# Patient Record
Sex: FEMALE | Race: WHITE | Employment: UNEMPLOYED | URBAN - NONMETROPOLITAN AREA
[De-identification: names, ages, dates, MRNs, and addresses within clinical notes are randomized per-mention and may not be internally consistent; named-entity substitution may affect disease eponyms.]

---

## 2022-05-26 ENCOUNTER — HOSPITAL ENCOUNTER (EMERGENCY)
Age: 47
Discharge: HOME | End: 2022-05-26
Attending: EMERGENCY MEDICINE
Payer: COMMERCIAL

## 2022-05-26 VITALS
TEMPERATURE: 98.2 F | SYSTOLIC BLOOD PRESSURE: 176 MMHG | OXYGEN SATURATION: 98 % | DIASTOLIC BLOOD PRESSURE: 89 MMHG | HEART RATE: 86 BPM | RESPIRATION RATE: 16 BRPM

## 2022-05-26 DIAGNOSIS — M79.671 RIGHT FOOT PAIN: Primary | ICD-10-CM

## 2022-05-26 PROCEDURE — 99281 EMR DPT VST MAYX REQ PHY/QHP: CPT

## 2022-05-26 ASSESSMENT — PAIN DESCRIPTION - DESCRIPTORS: DESCRIPTORS: ACHING

## 2022-05-26 ASSESSMENT — ENCOUNTER SYMPTOMS
ABDOMINAL PAIN: 0
TROUBLE SWALLOWING: 0
NAUSEA: 0
SHORTNESS OF BREATH: 0
COUGH: 0
VOMITING: 0
EYE REDNESS: 0
COLOR CHANGE: 0
BACK PAIN: 0

## 2022-05-26 ASSESSMENT — PAIN - FUNCTIONAL ASSESSMENT: PAIN_FUNCTIONAL_ASSESSMENT: 0-10

## 2022-05-26 ASSESSMENT — PAIN DESCRIPTION - LOCATION: LOCATION: FOOT

## 2022-05-26 ASSESSMENT — PAIN DESCRIPTION - ORIENTATION: ORIENTATION: RIGHT

## 2022-05-26 ASSESSMENT — PAIN SCALES - GENERAL: PAINLEVEL_OUTOF10: 8

## 2022-05-26 NOTE — ED PROVIDER NOTES
[facility] [address] [address] EMERGENCY DEPT    EMERGENCY MEDICINE     Pt Name: Kofi Mariee  MRN: [id number]  [place] 1975  Date of evaluation: 5/26/2022  Provider: Lyric Irizarry MD,     [address] [place] [address]       Chief Complaint   Patient presents with    Foot Pain     right       HISTORY OF PRESENT ILLNESS    Kofi Mariee is a pleasant 42 y.o. female who presents to the emergency department from home who presents for right foot pain. Patient states that she has noticed pain since May 19. She denies any known injury. She does not recall dropping any objects on it, twisting it, or tripping. Patient states that she has known plantar fasciitis however this pain is on the top of her foot. It starts near her fifth toe and radiates across the foot to her large toe. She states it does not hurt to touch but is painful to put pressure on. She does admit that she has been on her feet a lot for work. She has been taking Motrin with minimal relief for pain. Triage notes and Nursing notes were reviewed by myself. Any discrepancies are addressed above. PAST MEDICAL HISTORY   No past medical history on file. SURGICAL HISTORY     No past surgical history on file. CURRENT MEDICATIONS       There are no discharge medications for this patient. ALLERGIES     Patient has no known allergies. FAMILY HISTORY     No family history on file.      SOCIAL HISTORY       Social History     Socioeconomic History    Marital status:      Spouse name: Not on file    Number of children: Not on file    Years of education: Not on file    Highest education level: Not on file   Occupational History    Not on file   Tobacco Use    Smoking status: Never Smoker    Smokeless tobacco: Never Used   Substance and Sexual Activity    Alcohol use: Never    Drug use: Never    Sexual activity: Not on file   Other Topics Concern    Not on file   Social History Narrative    Not on file     Social Determinants of Health Financial Resource Strain:     Difficulty of Paying Living Expenses: Not on file   Food Insecurity:     Worried About Running Out of Food in the Last Year: Not on file    Maida of Food in the Last Year: Not on file   Transportation Needs:     Lack of Transportation (Medical): Not on file    Lack of Transportation (Non-Medical): Not on file   Physical Activity:     Days of Exercise per Week: Not on file    Minutes of Exercise per Session: Not on file   Stress:     Feeling of Stress : Not on file   Social Connections:     Frequency of Communication with Friends and Family: Not on file    Frequency of Social Gatherings with Friends and Family: Not on file    Attends Latter day Services: Not on file    Active Member of [address] [place] [address] or Organizations: Not on file    Attends Club or Organization Meetings: Not on file    Marital Status: Not on file   Intimate Partner Violence:     Fear of Current or Ex-Partner: Not on file    Emotionally Abused: Not on file    Physically Abused: Not on file    Sexually Abused: Not on file   Housing Stability:     Unable to Pay for Housing in the Last Year: Not on file    Number of [place] in the Last Year: Not on file    Unstable Housing in the Last Year: Not on file       REVIEW OF SYSTEMS     Review of Systems   Constitutional: Negative for fatigue and fever. HENT: Negative for congestion and trouble swallowing. Eyes: Negative for redness. Respiratory: Negative for cough and shortness of breath. Cardiovascular: Negative for chest pain. Gastrointestinal: Negative for abdominal pain, nausea and vomiting. Genitourinary: Negative for dysuria. Musculoskeletal: Positive for arthralgias. Negative for back pain. Skin: Negative for color change, rash and wound. Allergic/Immunologic: Negative for immunocompromised state. Neurological: Negative for light-headedness. Hematological: Does not bruise/bleed easily.        Except as noted above the remainder of the review of systems was reviewed and is. PHYSICAL EXAM    (up to 7 for level 4, 8 or more for level 5)     ED Triage Vitals [05/26/22 1851]   BP Temp Temp src Heart Rate Resp SpO2 Height Weight   (!) 176/89 98.2 °F (36.8 °C) -- 86 16 98 % -- --       Physical Exam  Vitals and nursing note reviewed. Exam conducted with a chaperone present. Constitutional:       General: She is not in acute distress. Appearance: She is normal weight. She is not ill-appearing. HENT:      Head: Normocephalic and atraumatic. Nose: Nose normal. No congestion. Mouth/Throat:      Mouth: Mucous membranes are moist.      Pharynx: Oropharynx is clear. No oropharyngeal exudate or posterior oropharyngeal erythema. Eyes:      Extraocular Movements: Extraocular movements intact. Pupils: Pupils are equal, round, and reactive to light. Cardiovascular:      Rate and Rhythm: Normal rate and regular rhythm. Pulses: Normal pulses. Dorsalis pedis pulses are 2+ on the right side. Posterior tibial pulses are 2+ on the right side. Heart sounds: No murmur heard. No friction rub. Pulmonary:      Effort: Pulmonary effort is normal. No respiratory distress. Breath sounds: Normal breath sounds. No wheezing. Abdominal:      General: Abdomen is flat. There is no distension. Palpations: Abdomen is soft. Tenderness: There is no abdominal tenderness. There is no guarding or rebound. Musculoskeletal:         General: Normal range of motion. Right ankle: No swelling. Normal range of motion. Right Achilles Tendon: No tenderness. Nieto's test negative. Right foot: Normal range of motion and normal capillary refill. Tenderness present. No swelling, deformity, foot drop or bony tenderness. Normal pulse. Comments: Patient had tenderness with flexion and extension. Feet:      Right foot:      Skin integrity: Skin integrity normal. No blister, erythema or dry skin. Toenail Condition: Right toenails are normal.   Skin:     General: Skin is warm and dry. Capillary Refill: Capillary refill takes less than 2 seconds. Neurological:      General: No focal deficit present. Mental Status: She is alert and oriented to person, place, and time. DIAGNOSTIC RESULTS     EKG:(none if blank)  All EKG's are interpreted by [facility] Physician who either signs or Co-signs this chart in the absence of a cardiologist.        RADIOLOGY: (none if blank)   Interpretation per the Radiologistbelow, if available at the time of this note:    No orders to display       LABS:  Labs Reviewed - No data to display    All other labs were within normal range or not returned as of this dictation. Please note, any cultures that may have been sent were not resulted at the time of this patient visit. EMERGENCY DEPARTMENT COURSE andMedical Decision Making:     MDM  Number of Diagnoses or Management Options  Right foot pain  Diagnosis management comments: 51-year-old female presents emergency room for right foot pain. Patient does not have any known injury. There is no deformity, rash, wound, or injury noted on her foot. She has normal pulses and I am able to range her foot completely. She had some mild tenderness with extension and flexion of the foot. I offered the patient an x-ray to evaluate the bones however she refused the x-ray as she does not feel that there is a problem with her bones given that she does not know of any injury. I explained that it I did not see any evidence of cellulitis, injury, gout or swelling. I would be unable to give her a definitive diagnosis of the cause of her foot pain. Patient then stated she was \"never coming back to the ER because we can never give her answers \".   I explained that the emergency room is very good at diagnosing acute injuries or if she thought she had a life-threatening emergency or illness however we are not excellent at diagnosing chronic pain and that orthopedics or podiatry would be better at this management. I did recommend that she continue Motrin and supportive shoes. I will give her a referral to podiatry for further management.  /  ED Course as of 05/26/22 1927   Thu May 26, 2022   1926 Patient left without discharge instructions. She told registration that she was unhappy because I was not going to do anything for her foot pain. [DD]      ED Course User Index  [DD] Flory Forbes MD         The patient was evaluated during the global COVID-19 pandemic, and that diagnosis was considered upon their initial presentation. Their evaluation, treatment and testing was consistent with current guidelines for patients who present with complaints or symptoms that may be related to COVID-19. Strict returnprecautions and follow up instructions were discussed with the patient with which the patient agrees        ED Medications administered this visit:  Medications - No data to display      Procedures: (None if blank)       CLINICAL       1. Right foot pain          DISPOSITION/PLAN   DISPOSITION Decision To Discharge 05/26/2022 07:02:23 PM      PATIENT REFERRED TO:  Karen Howard [address] [place] [address]  [phone]    Schedule an appointment as soon as possible for a visit   If symptoms worsen      DISCHARGE MEDICATIONS:  There are no discharge medications for this patient.              (Please note that portions of this note were completed with a voice recognition program.  Efforts were made to edit the dictations but occasionallywords are mis-transcribed.)      Electronically signed by Narda Ch MD on 5/26/22 at 7:04 PM EDT    Attending Physician, Emergency Department       Flory Forbes MD  05/26/22 6450

## 2022-05-26 NOTE — ED NOTES
Pt access tell this RN that pt is upset and that she is leaving before discharge is discussed.       Toni Villaseñor RN  05/26/22 1914

## 2022-05-26 NOTE — ED NOTES
Patient presents to ED with chief complaint of Right foot pain. NKI. Patient states she has had problems in the past. Pain started on Thursday 5/19/22, and has not gotten any better. Patient believe this could be because she has had to work on it all weekend and week. Patient resting in bed. Respirations easy and unlabored. No distress noted. Call light within reach.        Franko Rae RN  05/26/22 8365